# Patient Record
Sex: FEMALE | Race: BLACK OR AFRICAN AMERICAN | Employment: UNEMPLOYED | URBAN - METROPOLITAN AREA
[De-identification: names, ages, dates, MRNs, and addresses within clinical notes are randomized per-mention and may not be internally consistent; named-entity substitution may affect disease eponyms.]

---

## 2017-10-22 ENCOUNTER — HOSPITAL ENCOUNTER (OUTPATIENT)
Dept: INFUSION THERAPY | Age: 37
Discharge: HOME | End: 2017-10-22
Payer: COMMERCIAL

## 2017-10-22 VITALS
SYSTOLIC BLOOD PRESSURE: 154 MMHG | RESPIRATION RATE: 18 BRPM | TEMPERATURE: 98.8 F | HEART RATE: 129 BPM | OXYGEN SATURATION: 100 % | DIASTOLIC BLOOD PRESSURE: 66 MMHG

## 2017-10-22 PROCEDURE — 74011250636 HC RX REV CODE- 250/636

## 2017-10-22 PROCEDURE — 96365 THER/PROPH/DIAG IV INF INIT: CPT

## 2017-10-22 RX ADMIN — FERRIC CARBOXYMALTOSE INJECTION 750 MG: 50 INJECTION, SOLUTION INTRAVENOUS at 11:55

## 2017-10-22 NOTE — PROGRESS NOTES
Arrived to the [facility]. Injectafer 1st dose completed. Patient tolerated well. Any issues or concerns during appointment: had questions re: further workup for anemia, fatigue, weakness - encouraged patient to speak with PCP about concerns or referral. Voiced understanding. Patient aware of next infusion appointment on  10/29/17 @ 11am  Discharged ambulatory.

## 2017-10-29 ENCOUNTER — HOSPITAL ENCOUNTER (OUTPATIENT)
Dept: INFUSION THERAPY | Age: 37
Discharge: HOME | End: 2017-10-29
Payer: COMMERCIAL

## 2017-10-29 VITALS
DIASTOLIC BLOOD PRESSURE: 77 MMHG | HEART RATE: 62 BPM | TEMPERATURE: 98.1 F | OXYGEN SATURATION: 96 % | SYSTOLIC BLOOD PRESSURE: 131 MMHG | RESPIRATION RATE: 18 BRPM

## 2017-10-29 PROCEDURE — 74011250636 HC RX REV CODE- 250/636

## 2017-10-29 PROCEDURE — 96365 THER/PROPH/DIAG IV INF INIT: CPT

## 2017-10-29 RX ADMIN — FERRIC CARBOXYMALTOSE INJECTION 750 MG: 50 INJECTION, SOLUTION INTRAVENOUS at 12:53

## 2017-10-29 NOTE — PROGRESS NOTES
Arrived to the [facility]. Injectafer completed. Patient tolerated well. Pt observed for 30 minutes. Any issues or concerns during appointment: none. Discharged ambulatory.

## 2020-02-06 ENCOUNTER — APPOINTMENT (OUTPATIENT)
Dept: CT IMAGING | Age: 40
End: 2020-02-06
Attending: EMERGENCY MEDICINE
Payer: MEDICAID

## 2020-02-06 ENCOUNTER — APPOINTMENT (OUTPATIENT)
Dept: GENERAL RADIOLOGY | Age: 40
End: 2020-02-06
Attending: EMERGENCY MEDICINE
Payer: MEDICAID

## 2020-02-06 ENCOUNTER — HOSPITAL ENCOUNTER (EMERGENCY)
Age: 40
Discharge: HOME | End: 2020-02-06
Attending: EMERGENCY MEDICINE
Payer: MEDICAID

## 2020-02-06 VITALS
HEART RATE: 91 BPM | OXYGEN SATURATION: 98 % | BODY MASS INDEX: 34.99 KG/M2 | DIASTOLIC BLOOD PRESSURE: 106 MMHG | HEIGHT: 65 IN | RESPIRATION RATE: 18 BRPM | SYSTOLIC BLOOD PRESSURE: 190 MMHG | TEMPERATURE: 98.7 F | WEIGHT: 210 LBS

## 2020-02-06 DIAGNOSIS — G44.89 OTHER HEADACHE SYNDROME: ICD-10-CM

## 2020-02-06 DIAGNOSIS — R11.2 NON-INTRACTABLE VOMITING WITH NAUSEA, UNSPECIFIED VOMITING TYPE: ICD-10-CM

## 2020-02-06 DIAGNOSIS — R06.00 DYSPNEA, UNSPECIFIED TYPE: Primary | ICD-10-CM

## 2020-02-06 LAB
ALBUMIN SERPL-MCNC: 3.5 G/DL (ref 3.5–5)
ALBUMIN/GLOB SERPL: 0.6 {RATIO} (ref 1.2–3.5)
ALP SERPL-CCNC: 172 U/L (ref 50–136)
ALT SERPL-CCNC: 43 U/L (ref 12–65)
ANION GAP SERPL CALC-SCNC: 9 MMOL/L (ref 7–16)
AST SERPL-CCNC: 58 U/L (ref 15–37)
ATRIAL RATE: 139 BPM
BACTERIA URNS QL MICRO: 0 /HPF
BASOPHILS # BLD: 0 K/UL (ref 0–0.2)
BASOPHILS NFR BLD: 0 % (ref 0–2)
BILIRUB SERPL-MCNC: 0.3 MG/DL (ref 0.2–1.1)
BUN SERPL-MCNC: 4 MG/DL (ref 6–23)
CALCIUM SERPL-MCNC: 9.4 MG/DL (ref 8.3–10.4)
CALCULATED P AXIS, ECG09: 39 DEGREES
CALCULATED R AXIS, ECG10: 15 DEGREES
CALCULATED T AXIS, ECG11: 4 DEGREES
CASTS URNS QL MICRO: NORMAL /LPF
CHLORIDE SERPL-SCNC: 105 MMOL/L (ref 98–107)
CO2 SERPL-SCNC: 25 MMOL/L (ref 21–32)
CREAT SERPL-MCNC: 0.95 MG/DL (ref 0.6–1)
D DIMER PPP FEU-MCNC: 0.63 UG/ML(FEU)
DIAGNOSIS, 93000: NORMAL
DIFFERENTIAL METHOD BLD: ABNORMAL
EOSINOPHIL # BLD: 0 K/UL (ref 0–0.8)
EOSINOPHIL NFR BLD: 0 % (ref 0.5–7.8)
EPI CELLS #/AREA URNS HPF: NORMAL /HPF
ERYTHROCYTE [DISTWIDTH] IN BLOOD BY AUTOMATED COUNT: 18.1 % (ref 11.9–14.6)
FLUAV AG NPH QL IA: NEGATIVE
FLUBV AG NPH QL IA: NEGATIVE
GLOBULIN SER CALC-MCNC: 5.4 G/DL (ref 2.3–3.5)
GLUCOSE SERPL-MCNC: 109 MG/DL (ref 65–100)
HCT VFR BLD AUTO: 41.1 % (ref 35.8–46.3)
HGB BLD-MCNC: 13.3 G/DL (ref 11.7–15.4)
IMM GRANULOCYTES # BLD AUTO: 0 K/UL (ref 0–0.5)
IMM GRANULOCYTES NFR BLD AUTO: 0 % (ref 0–5)
LYMPHOCYTES # BLD: 1.6 K/UL (ref 0.5–4.6)
LYMPHOCYTES NFR BLD: 21 % (ref 13–44)
MAGNESIUM SERPL-MCNC: 1.8 MG/DL (ref 1.8–2.4)
MCH RBC QN AUTO: 29.2 PG (ref 26.1–32.9)
MCHC RBC AUTO-ENTMCNC: 32.4 G/DL (ref 31.4–35)
MCV RBC AUTO: 90.1 FL (ref 79.6–97.8)
MONOCYTES # BLD: 0.5 K/UL (ref 0.1–1.3)
MONOCYTES NFR BLD: 6 % (ref 4–12)
NEUTS SEG # BLD: 5.5 K/UL (ref 1.7–8.2)
NEUTS SEG NFR BLD: 72 % (ref 43–78)
NRBC # BLD: 0 K/UL (ref 0–0.2)
P-R INTERVAL, ECG05: 120 MS
PLATELET # BLD AUTO: 353 K/UL (ref 150–450)
PMV BLD AUTO: 10 FL (ref 9.4–12.3)
POTASSIUM SERPL-SCNC: 3.5 MMOL/L (ref 3.5–5.1)
PROT SERPL-MCNC: 8.9 G/DL (ref 6.3–8.2)
Q-T INTERVAL, ECG07: 282 MS
QRS DURATION, ECG06: 70 MS
QTC CALCULATION (BEZET), ECG08: 429 MS
RBC # BLD AUTO: 4.56 M/UL (ref 4.05–5.2)
RBC #/AREA URNS HPF: NORMAL /HPF
SODIUM SERPL-SCNC: 139 MMOL/L (ref 136–145)
SPECIMEN SOURCE: NORMAL
TROPONIN I SERPL-MCNC: <0.02 NG/ML (ref 0.02–0.05)
VENTRICULAR RATE, ECG03: 139 BPM
WBC # BLD AUTO: 7.7 K/UL (ref 4.3–11.1)
WBC URNS QL MICRO: NORMAL /HPF

## 2020-02-06 PROCEDURE — 74011000258 HC RX REV CODE- 258: Performed by: EMERGENCY MEDICINE

## 2020-02-06 PROCEDURE — 80053 COMPREHEN METABOLIC PANEL: CPT

## 2020-02-06 PROCEDURE — 83735 ASSAY OF MAGNESIUM: CPT

## 2020-02-06 PROCEDURE — 93005 ELECTROCARDIOGRAM TRACING: CPT | Performed by: EMERGENCY MEDICINE

## 2020-02-06 PROCEDURE — 84484 ASSAY OF TROPONIN QUANT: CPT

## 2020-02-06 PROCEDURE — 74011000250 HC RX REV CODE- 250: Performed by: EMERGENCY MEDICINE

## 2020-02-06 PROCEDURE — 71045 X-RAY EXAM CHEST 1 VIEW: CPT

## 2020-02-06 PROCEDURE — 81015 MICROSCOPIC EXAM OF URINE: CPT

## 2020-02-06 PROCEDURE — 96375 TX/PRO/DX INJ NEW DRUG ADDON: CPT

## 2020-02-06 PROCEDURE — 74011250637 HC RX REV CODE- 250/637: Performed by: EMERGENCY MEDICINE

## 2020-02-06 PROCEDURE — 74011636320 HC RX REV CODE- 636/320: Performed by: EMERGENCY MEDICINE

## 2020-02-06 PROCEDURE — 96361 HYDRATE IV INFUSION ADD-ON: CPT

## 2020-02-06 PROCEDURE — 74011250636 HC RX REV CODE- 250/636: Performed by: EMERGENCY MEDICINE

## 2020-02-06 PROCEDURE — 96374 THER/PROPH/DIAG INJ IV PUSH: CPT

## 2020-02-06 PROCEDURE — 71260 CT THORAX DX C+: CPT

## 2020-02-06 PROCEDURE — 81003 URINALYSIS AUTO W/O SCOPE: CPT

## 2020-02-06 PROCEDURE — 87804 INFLUENZA ASSAY W/OPTIC: CPT

## 2020-02-06 PROCEDURE — 85379 FIBRIN DEGRADATION QUANT: CPT

## 2020-02-06 PROCEDURE — 99285 EMERGENCY DEPT VISIT HI MDM: CPT

## 2020-02-06 PROCEDURE — 85025 COMPLETE CBC W/AUTO DIFF WBC: CPT

## 2020-02-06 RX ORDER — LISINOPRIL 10 MG/1
10 TABLET ORAL DAILY
Qty: 30 TAB | Refills: 0 | Status: SHIPPED | OUTPATIENT
Start: 2020-02-06 | End: 2020-03-07

## 2020-02-06 RX ORDER — THIAMINE HYDROCHLORIDE 100 MG/ML
100 INJECTION, SOLUTION INTRAMUSCULAR; INTRAVENOUS
Status: COMPLETED | OUTPATIENT
Start: 2020-02-06 | End: 2020-02-06

## 2020-02-06 RX ORDER — LORAZEPAM 1 MG/1
1 TABLET ORAL
Status: COMPLETED | OUTPATIENT
Start: 2020-02-06 | End: 2020-02-06

## 2020-02-06 RX ORDER — ONDANSETRON 4 MG/1
4 TABLET, FILM COATED ORAL
Qty: 12 TAB | Refills: 0 | Status: SHIPPED | OUTPATIENT
Start: 2020-02-06 | End: 2020-02-06

## 2020-02-06 RX ORDER — ONDANSETRON 4 MG/1
4 TABLET, FILM COATED ORAL
Qty: 12 TAB | Refills: 0 | Status: SHIPPED | OUTPATIENT
Start: 2020-02-06

## 2020-02-06 RX ORDER — HYDROCODONE BITARTRATE AND ACETAMINOPHEN 5; 325 MG/1; MG/1
1 TABLET ORAL ONCE
Status: COMPLETED | OUTPATIENT
Start: 2020-02-06 | End: 2020-02-06

## 2020-02-06 RX ORDER — BUTALBITAL, ACETAMINOPHEN AND CAFFEINE 300; 40; 50 MG/1; MG/1; MG/1
1 CAPSULE ORAL
Qty: 12 CAP | Refills: 0 | Status: SHIPPED | OUTPATIENT
Start: 2020-02-06 | End: 2020-02-06

## 2020-02-06 RX ORDER — ACETAMINOPHEN 500 MG
1000 TABLET ORAL
Status: COMPLETED | OUTPATIENT
Start: 2020-02-06 | End: 2020-02-06

## 2020-02-06 RX ORDER — ONDANSETRON 2 MG/ML
4 INJECTION INTRAMUSCULAR; INTRAVENOUS
Status: COMPLETED | OUTPATIENT
Start: 2020-02-06 | End: 2020-02-06

## 2020-02-06 RX ORDER — SODIUM CHLORIDE 0.9 % (FLUSH) 0.9 %
10 SYRINGE (ML) INJECTION
Status: COMPLETED | OUTPATIENT
Start: 2020-02-06 | End: 2020-02-06

## 2020-02-06 RX ORDER — LABETALOL HYDROCHLORIDE 5 MG/ML
20 INJECTION, SOLUTION INTRAVENOUS
Status: COMPLETED | OUTPATIENT
Start: 2020-02-06 | End: 2020-02-06

## 2020-02-06 RX ORDER — BUTALBITAL, ACETAMINOPHEN AND CAFFEINE 300; 40; 50 MG/1; MG/1; MG/1
1 CAPSULE ORAL
Qty: 12 CAP | Refills: 0 | Status: SHIPPED | OUTPATIENT
Start: 2020-02-06 | End: 2022-05-11

## 2020-02-06 RX ORDER — SODIUM CHLORIDE 9 MG/ML
1000 INJECTION, SOLUTION INTRAVENOUS ONCE
Status: COMPLETED | OUTPATIENT
Start: 2020-02-06 | End: 2020-02-06

## 2020-02-06 RX ADMIN — LABETALOL HYDROCHLORIDE 20 MG: 5 INJECTION INTRAVENOUS at 10:52

## 2020-02-06 RX ADMIN — Medication 10 ML: at 12:43

## 2020-02-06 RX ADMIN — SODIUM CHLORIDE 1000 ML: 900 INJECTION, SOLUTION INTRAVENOUS at 09:11

## 2020-02-06 RX ADMIN — LORAZEPAM 1 MG: 1 TABLET ORAL at 09:10

## 2020-02-06 RX ADMIN — METHYLPREDNISOLONE SODIUM SUCCINATE 125 MG: 125 INJECTION, POWDER, FOR SOLUTION INTRAMUSCULAR; INTRAVENOUS at 09:11

## 2020-02-06 RX ADMIN — SODIUM CHLORIDE 100 ML: 900 INJECTION, SOLUTION INTRAVENOUS at 12:43

## 2020-02-06 RX ADMIN — ACETAMINOPHEN 1000 MG: 500 TABLET, FILM COATED ORAL at 09:10

## 2020-02-06 RX ADMIN — ONDANSETRON 4 MG: 2 INJECTION INTRAMUSCULAR; INTRAVENOUS at 09:11

## 2020-02-06 RX ADMIN — HYDROCODONE BITARTRATE AND ACETAMINOPHEN 1 TABLET: 5; 325 TABLET ORAL at 13:23

## 2020-02-06 RX ADMIN — THIAMINE HYDROCHLORIDE 100 MG: 100 INJECTION, SOLUTION INTRAMUSCULAR; INTRAVENOUS at 09:11

## 2020-02-06 RX ADMIN — IOPAMIDOL 100 ML: 755 INJECTION, SOLUTION INTRAVENOUS at 12:43

## 2020-02-06 NOTE — ED PROVIDER NOTES
Patient complains of diarrhea onset Tuesday, February 4. Multiple loose BM's - about 4 to 5 per day. Last one at 4 am today. Yesterday onset of illness hitting her like a \" ton of bricks\". Felt hot and cold. No temp taken at home. Runny nose, cough productive of yellow phlegm, sore throat, headache, vomiting. States vomited all night long. No hematemesis. SOB today. Used Albuterol x 2. History of asthma. Does not smoke. Does drink alcohol daily. Last drink was yesterday. She has had a gastric bypass and has had a uterine ablation. No menses in several years.             Past Medical History:   Diagnosis Date    Asthma     Gastrointestinal disorder     IBS    Obesity, morbid (more than 100 lbs over ideal weight or BMI > 40 BMI 41.6    Ovarian cyst        Past Surgical History:   Procedure Laterality Date    ABDOMEN SURGERY PROC UNLISTED      debride where MIRANDA tube was    HX GYN      C-sec X 2;  uterine ablation/tubal         Family History:   Problem Relation Age of Onset    Diabetes Mother     Ovarian Cancer Sister     Prostate Cancer Brother     Breast Cancer Sister        Social History     Socioeconomic History    Marital status:      Spouse name: Not on file    Number of children: Not on file    Years of education: Not on file    Highest education level: Not on file   Occupational History    Not on file   Social Needs    Financial resource strain: Not on file    Food insecurity:     Worry: Not on file     Inability: Not on file    Transportation needs:     Medical: Not on file     Non-medical: Not on file   Tobacco Use    Smoking status: Never Smoker   Substance and Sexual Activity    Alcohol use: No    Drug use: No    Sexual activity: Yes     Partners: Male     Birth control/protection: Surgical     Comment: Ablation   Lifestyle    Physical activity:     Days per week: Not on file     Minutes per session: Not on file    Stress: Not on file   Relationships    Social connections: Talks on phone: Not on file     Gets together: Not on file     Attends Yazidism service: Not on file     Active member of club or organization: Not on file     Attends meetings of clubs or organizations: Not on file     Relationship status: Not on file    Intimate partner violence:     Fear of current or ex partner: Not on file     Emotionally abused: Not on file     Physically abused: Not on file     Forced sexual activity: Not on file   Other Topics Concern    Not on file   Social History Narrative    Not on file         ALLERGIES: Pcn [penicillins]; Apple; and Peach (prunus persica)    Review of Systems   Constitutional: Positive for chills and fever (subjective). HENT: Positive for congestion, rhinorrhea and sore throat. Eyes: Negative. Respiratory: Positive for cough and shortness of breath. Cardiovascular: Negative for chest pain, palpitations and leg swelling. Gastrointestinal: Positive for diarrhea, nausea and vomiting. Negative for abdominal pain. Genitourinary: Positive for frequency and urgency. Negative for dysuria. Musculoskeletal: Positive for myalgias. Skin: Negative. Neurological: Positive for headaches. Hematological: Negative. Vitals:    02/06/20 0825 02/06/20 0837   BP: (!) 157/116 (!) 165/91   Pulse: (!) 136    Resp: 18    Temp: 98.7 °F (37.1 °C)    SpO2: 100% 99%   Weight: 95.3 kg (210 lb)    Height: 5' 5\" (1.651 m)             Physical Exam  Vitals signs and nursing note reviewed. Constitutional:       Appearance: She is obese. She is not ill-appearing. HENT:      Right Ear: Tympanic membrane normal.      Left Ear: Tympanic membrane normal.      Nose: Congestion present. Mouth/Throat:      Mouth: Mucous membranes are moist.      Pharynx: No oropharyngeal exudate or posterior oropharyngeal erythema. Eyes:      Extraocular Movements: Extraocular movements intact. Pupils: Pupils are equal, round, and reactive to light.    Neck: Musculoskeletal: Normal range of motion and neck supple. Cardiovascular:      Rate and Rhythm: Tachycardia present. Heart sounds: Normal heart sounds. No murmur. No gallop. Pulmonary:      Effort: Pulmonary effort is normal.      Breath sounds: Normal breath sounds. Abdominal:      General: Bowel sounds are normal.      Palpations: Abdomen is soft. Tenderness: There is no abdominal tenderness. Musculoskeletal:         General: No swelling. Lymphadenopathy:      Cervical: No cervical adenopathy. Skin:     General: Skin is warm and dry. Findings: No rash. Neurological:      General: No focal deficit present. Mental Status: She is alert. Psychiatric:         Mood and Affect: Mood normal.          MDM  Number of Diagnoses or Management Options  Dyspnea, unspecified type:   Non-intractable vomiting with nausea, unspecified vomiting type: Other headache syndrome:   Diagnosis management comments: Vomiting, diarrhea, cough, SOB  EKG sinus tachycardia rate 139  CXR hypoinflation, no consolidation. Radiology report reviewed  Labs reviewed  NS 1 liter IV  Thiamine 100 mg IV  Zofran 4 mg IV  Solumedrol 125 mg IV  Tylenol 1000 mg po  Ativan 1 mg po  CT chest negative for infiltrate or PE  Tichnor 5 for headache  Labetalol for tachycardia / hypertension  Improved  VS improved  Results and instructions discussed with patient and friend  Rx Lisinopril 10 daily, Fioricet prn, Zofran prn  Follow up with PCP - needs to establish  Return with new or worse symptoms.          Amount and/or Complexity of Data Reviewed  Clinical lab tests: ordered and reviewed  Tests in the radiology section of CPT®: ordered and reviewed  Review and summarize past medical records: yes  Independent visualization of images, tracings, or specimens: yes    Critical Care  Total time providing critical care: 30-74 minutes (Critical care time 30 minutes)    Patient Progress  Patient progress: improved Procedures

## 2020-02-06 NOTE — ED TRIAGE NOTES
Patient states sob, migraine, n/v and diarrhea. States diarrhea for 2 days. N/v began last night, unable to keep any food or liquids down. Migraine began last night, hx of migraines. Patient with hx of asthma. Gave self nebulizer at home this morning at 0300 and another at 0500.  O2 in triage 97% RA

## 2020-02-06 NOTE — ED NOTES
I have reviewed discharge instructions with the patient. The patient verbalized understanding. Patient left ED via Discharge Method: ambulatory to Home with friend    Opportunity for questions and clarification provided. Patient given 3 scripts. No esign        To continue your aftercare when you leave the hospital, you may receive an automated call from our care team to check in on how you are doing. This is a free service and part of our promise to provide the best care and service to meet your aftercare needs.  If you have questions, or wish to unsubscribe from this service please call [phone]. Thank you for Choosing our [facility] Emergency Department.

## 2022-05-11 PROBLEM — F10.939 ALCOHOL WITHDRAWAL SEIZURE WITH COMPLICATION (HCC): Status: ACTIVE | Noted: 2021-08-15

## 2022-05-11 PROBLEM — F33.2 SEVERE EPISODE OF RECURRENT MAJOR DEPRESSIVE DISORDER, WITHOUT PSYCHOTIC FEATURES (HCC): Status: ACTIVE | Noted: 2020-02-25

## 2022-05-11 PROBLEM — J45.909 ASTHMA: Status: ACTIVE | Noted: 2019-02-10

## 2022-05-11 PROBLEM — R56.9 ALCOHOL WITHDRAWAL SEIZURE WITH COMPLICATION (HCC): Status: ACTIVE | Noted: 2021-08-15

## 2022-05-11 PROBLEM — I50.9 CHF (CONGESTIVE HEART FAILURE) (HCC): Status: ACTIVE | Noted: 2019-02-10

## 2022-05-11 PROBLEM — F41.1 GENERALIZED ANXIETY DISORDER: Status: ACTIVE | Noted: 2020-02-25

## 2022-05-11 PROBLEM — E55.9 VITAMIN D DEFICIENCY: Status: ACTIVE | Noted: 2020-08-13

## 2022-05-12 PROBLEM — R53.83 FATIGUE: Status: ACTIVE | Noted: 2022-05-12

## 2022-05-12 PROBLEM — Z98.84 S/P GASTRIC BYPASS: Status: ACTIVE | Noted: 2022-05-12

## 2022-05-12 PROBLEM — M25.561 CHRONIC PAIN OF RIGHT KNEE: Status: ACTIVE | Noted: 2022-05-12

## 2022-05-12 PROBLEM — G89.29 CHRONIC PAIN OF RIGHT KNEE: Status: ACTIVE | Noted: 2022-05-12

## 2022-05-16 ENCOUNTER — HOSPITAL ENCOUNTER (OUTPATIENT)
Dept: GENERAL RADIOLOGY | Age: 42
Discharge: HOME | End: 2022-05-16
Payer: COMMERCIAL

## 2022-05-16 DIAGNOSIS — G89.29 CHRONIC PAIN OF RIGHT KNEE: ICD-10-CM

## 2022-05-16 DIAGNOSIS — M25.561 CHRONIC PAIN OF RIGHT KNEE: ICD-10-CM

## 2022-05-16 PROCEDURE — 73562 X-RAY EXAM OF KNEE 3: CPT

## 2022-05-16 NOTE — PROGRESS NOTES
Please let patient know her xray revealed Osteoarthritis of the patellofemoral joint. Advise physical therapy. Can also refer to ortho if she would like to consider other treatments like steroid injections.      Betito Barragan NP

## 2022-05-31 ENCOUNTER — INITIAL CONSULT (OUTPATIENT)
Dept: CARDIOLOGY CLINIC | Age: 42
End: 2022-05-31
Payer: MEDICAID

## 2022-05-31 VITALS
BODY MASS INDEX: 37.65 KG/M2 | SYSTOLIC BLOOD PRESSURE: 98 MMHG | HEIGHT: 65 IN | HEART RATE: 65 BPM | WEIGHT: 226 LBS | DIASTOLIC BLOOD PRESSURE: 64 MMHG

## 2022-05-31 DIAGNOSIS — I10 PRIMARY HYPERTENSION: ICD-10-CM

## 2022-05-31 DIAGNOSIS — I50.32 CHRONIC DIASTOLIC CONGESTIVE HEART FAILURE (HCC): Primary | ICD-10-CM

## 2022-05-31 PROCEDURE — 99244 OFF/OP CNSLTJ NEW/EST MOD 40: CPT | Performed by: INTERNAL MEDICINE

## 2022-05-31 PROCEDURE — 93000 ELECTROCARDIOGRAM COMPLETE: CPT | Performed by: INTERNAL MEDICINE

## 2022-05-31 RX ORDER — FERROUS SULFATE 324(65)MG
324 TABLET, DELAYED RELEASE (ENTERIC COATED) ORAL
COMMUNITY
Start: 2022-05-13

## 2022-05-31 RX ORDER — OLMESARTAN MEDOXOMIL 20 MG/1
20 TABLET ORAL DAILY
COMMUNITY

## 2022-05-31 RX ORDER — METOPROLOL SUCCINATE 25 MG/1
TABLET, EXTENDED RELEASE ORAL
COMMUNITY
Start: 2022-05-16

## 2022-05-31 RX ORDER — PHENOL 1.4 %
AEROSOL, SPRAY (ML) MUCOUS MEMBRANE
COMMUNITY

## 2022-05-31 RX ORDER — BUDESONIDE AND FORMOTEROL FUMARATE DIHYDRATE 160; 4.5 UG/1; UG/1
2 AEROSOL RESPIRATORY (INHALATION)
COMMUNITY

## 2022-05-31 RX ORDER — POTASSIUM CHLORIDE 20 MEQ/1
TABLET, EXTENDED RELEASE ORAL
COMMUNITY
Start: 2022-05-16

## 2022-05-31 RX ORDER — CETIRIZINE HYDROCHLORIDE 10 MG/1
10 TABLET ORAL DAILY
COMMUNITY

## 2022-05-31 RX ORDER — DULOXETIN HYDROCHLORIDE 20 MG/1
30 CAPSULE, DELAYED RELEASE ORAL DAILY
COMMUNITY
Start: 2021-09-14

## 2022-05-31 RX ORDER — ALBUTEROL SULFATE 90 UG/1
AEROSOL, METERED RESPIRATORY (INHALATION) EVERY 4 HOURS PRN
COMMUNITY

## 2022-05-31 RX ORDER — MELOXICAM 15 MG/1
15 TABLET ORAL DAILY
COMMUNITY
End: 2022-07-12

## 2022-05-31 RX ORDER — BUSPIRONE HYDROCHLORIDE 30 MG/1
30 TABLET ORAL DAILY
COMMUNITY

## 2022-05-31 NOTE — PROGRESS NOTES
[address], [address] [employer] [address]  PHONE: [phone]        22    NAME:  Malachi Aldrich  : 1980  MRN: [id number]       SUBJECTIVE:   Malachi Aldrich is a 35 y.o. female seen for a consultation visit regarding the following:     Chief Complaint   Patient presents with    Congestive Heart Failure    Other     Consult          HPI:  Consultation is requested by SÁNCHEZ Zarate CNP for evaluation of Congestive Heart Failure and Other (Consult)  Prior NICM yrs ago she reports.  Echo [place] showed EF 60%. Claims yrs ago, she had edema. Some recent edema again. On new BP meds now, this is new issue. On ARB. No CP, pressure. On BB now as well. Edema better now. Patient denies recent history of orthopnea, PND, excessive dizziness and/or syncope. Former heavy alcohol user, stopped now. Been in rehab. Past Medical History, Past Surgical History, Family history, Social History, and Medications were all reviewed with the patient today and updated as necessary.        Current Outpatient Medications   Medication Sig Dispense Refill    Cholecalciferol 50 MCG ( UT) CAPS Take 2,000 Units by mouth 2 times daily      Ferrous Sulfate 324 MG TBEC Take 324 mg by mouth      meloxicam (MOBIC) 15 MG tablet Take 15 mg by mouth daily      busPIRone (BUSPAR) 30 MG tablet Take 30 mg by mouth daily      cetirizine (ZYRTEC) 10 MG tablet Take 10 mg by mouth daily      DULoxetine (CYMBALTA) 20 MG extended release capsule Take 30 mg by mouth daily      Melatonin 10 MG TABS Take by mouth      metoprolol succinate (TOPROL XL) 25 MG extended release tablet       Multiple Vitamin (MVI, CELEBRATE, CHEWABLE TABLET) Take by mouth daily      potassium chloride (KLOR-CON M) 20 MEQ extended release tablet       albuterol sulfate  (90 Base) MCG/ACT inhaler Inhale into the lungs every 4 hours as needed      budesonide-formoterol (SYMBICORT) 160-4.5 MCG/ACT AERO Inhale 2 puffs into the lungs      olmesartan (BENICAR) 20 mg tablet Take 20 mg by mouth daily       No current facility-administered medications for this visit. Allergies   Allergen Reactions    Penicillins Other (See Comments)     Abdominal Pain    Apple Itching and Swelling    Prunus Persica Itching and Swelling     Patient Active Problem List    Diagnosis Date Noted    Primary hypertension 05/31/2022     Priority: Medium    S/P gastric bypass 05/12/2022    Fatigue 05/12/2022    Chronic pain of right knee 05/12/2022    Alcohol withdrawal seizure with complication ([facility] [address] 11/89/3913    Vitamin D deficiency 08/13/2020    Generalized anxiety disorder 02/25/2020    Severe episode of recurrent major depressive disorder, without psychotic features ([facility] [address] 02/25/2020    Chronic diastolic congestive heart failure ([facility] [address] 02/10/2019    Asthma 02/10/2019    GERD (gastroesophageal reflux disease) 04/23/2015    Malnutrition following gastrointestinal surgery 04/23/2015      Past Surgical History:   Procedure Laterality Date    GYN      C-sec X 2;  uterine ablation/tubal    ME ABDOMEN SURGERY PROC UNLISTED      debride where SALAZAR tube was     Family History   Problem Relation Age of Onset    Breast Cancer Sister     Prostate Cancer Brother     Ovarian Cancer Sister     Diabetes Mother      Social History     Tobacco Use    Smoking status: Never Smoker    Smokeless tobacco: Never Used   Substance Use Topics    Alcohol use: No       ROS:    Constitutional:   Negative for fevers and unexplained weight loss. Eyes:   Negative for visual disturbance. ENT:   Negative for significant hearing loss and tinnitus. Respiratory:   Negative for hemoptysis. Cardiovascular:   Negative except as noted in HPI. Gastrointestinal:   Negative for melena and abdominal pain. Genitourinary:   Negative for hematuria, renal stones.   Integumentary:   Negative for rash or non-healing wounds  Hematologic/Lymphatic:   Negative for excessive bleeding hx or clotting disorder. Musculoskeletal:  Negative for active, unexplained/severe joint pain. Neurological:   Negative for stroke. Behavioral/Psych:   Negative for suicidal ideations. Endocrine:   Negative for uncontrolled diabetic symptoms including polyuria, polydipsia and poor wound healing. PHYSICAL EXAM:     BP 98/64   Pulse 65   Ht 5' 5\" (1.651 m)   Wt 226 lb (102.5 kg) Comment: with shoes  BMI 37.61 kg/m²    General/Constitutional:   Alert and oriented x 3, no acute distress  HEENT:   normocephalic, atraumatic, moist mucous membranes  Neck:   No JVD or carotid bruits bilaterally  Cardiovascular:   regular rate and rhythm, no murmur/rub/gallop appreciated  Pulmonary:   clear to auscultation bilaterally, no respiratory distress  Abdomen:   soft, non-tender, non-distended  Ext:   No sig LE edema bilaterally  Skin:  warm and dry, no obvious rashes seen  Neuro:   no obvious sensory or motor deficits  Psychiatric:   normal mood and affect    EKG Today and independently reviewed by me: sinus rhythm, normal intervals and non-specific ST/T wave changes. Medical problems, medical history and test results were reviewed with the patient today.        Lab Results   Component Value Date     05/11/2022    K 5.1 05/11/2022     05/11/2022    CO2 20 05/11/2022    BUN 13 05/11/2022    CREATININE 0.90 05/11/2022    GLUCOSE 93 05/11/2022    CALCIUM 9.7 05/11/2022        Lab Results   Component Value Date    WBC 7.9 05/11/2022    HGB 11.2 05/11/2022    HCT 36.3 05/11/2022    MCV 80 05/11/2022     05/11/2022       Lab Results   Component Value Date    TSH 2.030 05/11/2022       No results found for: LABA1C  No results found for: EAG      Lab Results   Component Value Date    CHOL 224 (H) 05/11/2022     Lab Results   Component Value Date    TRIG 141 05/11/2022     Lab Results   Component Value Date    HDL 61 05/11/2022     Lab Results   Component Value Date    LDLCALC 138 (H) 05/11/2022     Lab Results   Component Value Date    VLDL 25 05/11/2022     No results found for: VIOLET        I have Independently reviewed prior care notes, any ER records available, cardiac testing, labs and results with the patient and before seeing the patient today. Also independently reviewed outside records when available. ASSESSMENT:    Geraldine Gordillo was seen today for congestive heart failure and other. Diagnoses and all orders for this visit:    CHF (congestive heart failure) (HCC)  -     EKG 12 Lead  -     Transthoracic echocardiogram (TTE) complete with contrast, bubble, strain, and 3D PRN; Future    Chronic diastolic congestive heart failure (HCC)  -     Transthoracic echocardiogram (TTE) complete with contrast, bubble, strain, and 3D PRN; Future    Primary hypertension          PLAN:     Prior reported NICM she claims, new HTN, quit alcohol 90 days ago. NICM from EtOH?? Check echo, assess EF. Remain on BB and ARB, follow Bps, assess for LVH. No stress test needed, The patient has been instructed to call with any angina or equivalent as reviewed today. All questions were answered with the patient voicing complete understanding. Patient has been instructed and agrees to call our office with any issues or other concerns related to their cardiac condition(s) and/or complaint(s).         Return for Return After Test.       YONNY DOAN,   5/31/2022

## 2022-06-13 DIAGNOSIS — H52.13 MYOPIA OF BOTH EYES: Primary | ICD-10-CM

## 2022-07-07 ENCOUNTER — TELEPHONE (OUTPATIENT)
Dept: CARDIOLOGY CLINIC | Age: 42
End: 2022-07-07

## 2022-07-07 NOTE — TELEPHONE ENCOUNTER
----- Message from Ele Grajeda DO sent at 7/6/2022  2:57 PM EDT -----  Please call the patient. ECHO was ok, nothing major or concerning. If having more angina (worsening RAMIREZ, CP, SOB), please let us know. Will review more at follow up appointment and get plan for the future.     Thanks,   Aldo Zafar

## 2022-07-12 ENCOUNTER — OFFICE VISIT (OUTPATIENT)
Dept: ORTHOPEDIC SURGERY | Age: 42
End: 2022-07-12
Payer: COMMERCIAL

## 2022-07-12 DIAGNOSIS — M25.561 RIGHT KNEE PAIN, UNSPECIFIED CHRONICITY: Primary | ICD-10-CM

## 2022-07-12 PROCEDURE — 99203 OFFICE O/P NEW LOW 30 MIN: CPT | Performed by: ORTHOPAEDIC SURGERY

## 2022-07-12 PROCEDURE — 20610 DRAIN/INJ JOINT/BURSA W/O US: CPT | Performed by: ORTHOPAEDIC SURGERY

## 2022-07-12 RX ORDER — TRIAMCINOLONE ACETONIDE 40 MG/ML
40 INJECTION, SUSPENSION INTRA-ARTICULAR; INTRAMUSCULAR ONCE
Status: COMPLETED | OUTPATIENT
Start: 2022-07-12 | End: 2022-07-12

## 2022-07-12 RX ORDER — CELECOXIB 200 MG/1
200 CAPSULE ORAL DAILY
Qty: 30 CAPSULE | Refills: 1 | Status: SHIPPED | OUTPATIENT
Start: 2022-07-12

## 2022-07-12 RX ADMIN — TRIAMCINOLONE ACETONIDE 40 MG: 40 INJECTION, SUSPENSION INTRA-ARTICULAR; INTRAMUSCULAR at 14:13

## 2022-07-12 NOTE — PROGRESS NOTES
Name: Simone Perla  YOB: 1980  Gender: female  MRN: [id number]      CC: New Patient (R knee pain)       HPI: Simone Perla is a 38 y.o. female who presents with New Patient (R knee pain)  Mrs. Shary Mcburney is a new patient who presents with right knee pain. She reports an ongoing history of knee pain that has worsened over the past few months without any mechanism of injury. She notes that her pain is constant and radiates down her shin. It worsens throughout the night and she often experiences stiffness and a popping sensation when she ambulates. She denies having had any formal treatment prior to today but has xrays for my review. She does have a remote history of patellofemoral syndrome and maltracking that was treated with physical therapy in the past when she was much younger. ROS/Meds/PSH/PMH/FH/SH: I personally reviewed the patients standard intake form. Below are the pertinents    Tobacco:  reports that she has never smoked. She has never used smokeless tobacco.  Diabetes: none  Other: CHF, history of alcohol abuse with withdrawal, GERD, asthma    Physical Examination:  General: no acute distress  Lungs: breathing easily  CV: regular rhythm by pulse  Right Knee: She has crepitus and pain upon flexion and extension of right knee with some lateral maltracking of her patella. She has tenderness palpation of the medial joint line tenderness in the parapatellar region around her patella. No effusion. Mild discomfort with meniscal provocative testing. Ligamentously stable x4      Imaging:   I reviewed plain radiographs of the right knee from 8/16/2022 which demonstrate mild joint space narrowing medially with early osteophyte formation no acute findings  All imaging interpreted by myself Winston Caruso MD independent of radiology review        Assessment:     ICD-10-CM    1.  Right knee pain, unspecified chronicity  M25.561 celecoxib (CELEBREX) 200 MG capsule     DRAIN/INJECT LARGE JOINT/BURSA     triamcinolone acetonide (KENALOG-40) injection 40 mg       Plan:   I think the majority of her symptoms are degenerative changes as well as patellofemoral maltracking and chondromalacia. I discussed physical therapy she says unfortunately she does not have transportation to get the physical therapy reliably. We taught her home exercise quad strengthening program today. We also discussed an intra-articular injection which she like to proceed with. We also discussed anti-inflammatories and Celebrex was prescribed. I will have her follow-up with Nayeli Manzo in 6 weeks if she is not improving the neck step would be an MRI scan. The patient elected to proceed with an intraarticular knee injection today. After verbal informed consent was obtained after sterile prep the right knee  was injected from a superior lateral approach with 4 cc of 0.25% Marcaine and 1 cc of 40 mg Kenalog. The patient tolerated the procedure well was given postinjection flare precautions. Clive Garcia MD, [facility]

## 2022-09-22 DIAGNOSIS — M25.561 RIGHT KNEE PAIN, UNSPECIFIED CHRONICITY: ICD-10-CM

## 2022-09-22 RX ORDER — CELECOXIB 200 MG/1
CAPSULE ORAL
Qty: 30 CAPSULE | Refills: 1 | OUTPATIENT
Start: 2022-09-22

## 2022-12-05 ENCOUNTER — TELEPHONE (OUTPATIENT)
Dept: ORTHOPEDIC SURGERY | Age: 42
End: 2022-12-05

## 2022-12-05 ENCOUNTER — OFFICE VISIT (OUTPATIENT)
Dept: ORTHOPEDIC SURGERY | Age: 42
End: 2022-12-05
Payer: MEDICAID

## 2022-12-05 VITALS — WEIGHT: 230 LBS | HEIGHT: 65 IN | BODY MASS INDEX: 38.32 KG/M2

## 2022-12-05 DIAGNOSIS — M25.561 RIGHT KNEE PAIN, UNSPECIFIED CHRONICITY: Primary | ICD-10-CM

## 2022-12-05 PROCEDURE — 99213 OFFICE O/P EST LOW 20 MIN: CPT | Performed by: SPECIALIST/TECHNOLOGIST

## 2022-12-05 RX ORDER — CELECOXIB 200 MG/1
200 CAPSULE ORAL DAILY
Qty: 30 CAPSULE | Refills: 1 | Status: SHIPPED | OUTPATIENT
Start: 2022-12-05

## 2022-12-05 NOTE — PROGRESS NOTES
Name: Lei Yancey  YOB: 1980  Gender: female  MRN: [id number]      CC: No chief complaint on file. HPI: Lei Yancey is a 38 y.o. female who returns for follow up on right knee. Pt states she sustained a fall 3 weeks ago to the right knee and was seen at the ER. She states she has a knot and cannot apply pressure to the right knee without unbearable pain. She reports that she never really had much relief from previous injection prior to the fall. Physical Examination:  General: no acute distress  Lungs: breathing easily  CV: regular rhythm by pulse  Right Knee: Raised area cystic in appearance present over the lateral knee. Minimal effusion present. Tenderness to palpation in both the medial and lateral joint line and over the area of cyst.  Full active and passive range of motion with pain in the extreme of extension. Negative ligamentous exam x4. Mild pain with Alin's. Positive bounce home test.        Assessment:     ICD-10-CM    1. Right knee pain, unspecified chronicity  M25.561           Plan:   Patient who had an acute traumatic fall onto her knee and presented with a cyst after injury that is painful. Patient has difficulty with weightbearing and had prior conservative treatment. Due to the acute nature of her fall and the presentation of the cyst which was not present prior to the fall think it is reasonable to evaluate her knee with an MRI. She can return after MRI for definitive treatment.             Mariah Cano, MARISOL  Orthopaedics and Sports Medicine

## 2023-03-21 ENCOUNTER — TELEPHONE (OUTPATIENT)
Dept: ORTHOPEDIC SURGERY | Age: 43
End: 2023-03-21

## 2023-03-22 ENCOUNTER — TELEPHONE (OUTPATIENT)
Dept: ORTHOPEDIC SURGERY | Age: 43
End: 2023-03-22

## 2023-03-22 RX ORDER — CELECOXIB 200 MG/1
200 CAPSULE ORAL DAILY
Qty: 60 CAPSULE | Refills: 3 | Status: SHIPPED | OUTPATIENT
Start: 2023-03-22

## 2023-03-22 NOTE — TELEPHONE ENCOUNTER
She was supposed to have Celebrex sent in but it's not at her pharmacy. She is now requesting it to be sent to the [facility] in [facility] since that's near her work and I have added that to her chart.

## 2023-07-30 ENCOUNTER — APPOINTMENT (OUTPATIENT)
Dept: GENERAL RADIOLOGY | Age: 43
End: 2023-07-30
Payer: MEDICAID

## 2023-07-30 ENCOUNTER — HOSPITAL ENCOUNTER (EMERGENCY)
Age: 43
Discharge: HOME | End: 2023-07-30
Attending: EMERGENCY MEDICINE
Payer: MEDICAID

## 2023-07-30 VITALS
DIASTOLIC BLOOD PRESSURE: 68 MMHG | RESPIRATION RATE: 16 BRPM | OXYGEN SATURATION: 99 % | TEMPERATURE: 99 F | WEIGHT: 240 LBS | HEART RATE: 94 BPM | BODY MASS INDEX: 39.99 KG/M2 | SYSTOLIC BLOOD PRESSURE: 103 MMHG | HEIGHT: 65 IN

## 2023-07-30 DIAGNOSIS — M25.561 CHRONIC PAIN OF RIGHT KNEE: ICD-10-CM

## 2023-07-30 DIAGNOSIS — M25.562 ACUTE PAIN OF LEFT KNEE: Primary | ICD-10-CM

## 2023-07-30 DIAGNOSIS — M79.9 SOFT TISSUE LESION OF KNEE REGION: ICD-10-CM

## 2023-07-30 DIAGNOSIS — G89.29 CHRONIC PAIN OF RIGHT KNEE: ICD-10-CM

## 2023-07-30 PROCEDURE — 73562 X-RAY EXAM OF KNEE 3: CPT

## 2023-07-30 PROCEDURE — 99283 EMERGENCY DEPT VISIT LOW MDM: CPT

## 2023-07-30 RX ORDER — MELOXICAM 15 MG/1
15 TABLET ORAL DAILY
Qty: 30 TABLET | Refills: 0 | Status: SHIPPED | OUTPATIENT
Start: 2023-07-30 | End: 2023-08-29

## 2023-07-30 ASSESSMENT — ENCOUNTER SYMPTOMS
FACIAL SWELLING: 0
SHORTNESS OF BREATH: 0
VOMITING: 0
TROUBLE SWALLOWING: 0
PHOTOPHOBIA: 0
COUGH: 0
ABDOMINAL PAIN: 0

## 2023-07-30 ASSESSMENT — PAIN SCALES - GENERAL: PAINLEVEL_OUTOF10: 8

## 2023-07-30 ASSESSMENT — PAIN DESCRIPTION - LOCATION: LOCATION: KNEE

## 2023-07-30 ASSESSMENT — PAIN DESCRIPTION - ORIENTATION: ORIENTATION: LEFT

## 2023-07-30 ASSESSMENT — PAIN - FUNCTIONAL ASSESSMENT: PAIN_FUNCTIONAL_ASSESSMENT: 0-10

## 2023-07-30 NOTE — DISCHARGE INSTRUCTIONS
Use Mobic once daily for pain. Do not take other NSAIDs like Celebrex or Advil while taking this medication. Return for new or worsening symptoms. Please follow-up with your orthopedic doctor for further management.

## 2023-07-30 NOTE — ED PROVIDER NOTES
Emergency Department Provider Note       PCP: Moni Manzano MD   Age: 37 y.o. Sex: female     DISPOSITION Decision To Discharge 07/30/2023 02:19:09 PM       ICD-10-CM    1. Acute pain of left knee  M25.562 [address] [place] [address]       2. Chronic pain of right knee  M25.561     G89.29       3. Soft tissue lesion of knee region  M79.9           Medical Decision Making     Complexity of Problems Addressed:  1 chronic illness with exacerbation, 1 acute uncomplicated illness    Data Reviewed and Analyzed:  Category 1:   I independently ordered and reviewed each unique test.  I reviewed external records: provider visit note from outside specialist.  I reviewed external records: previous imaging study including radiologist interpretation. Category 2:   I interpreted the X-rays no fracture, arthritis present. RADIOLOGY DISCLAIMER: Although I do my best to interpret the imaging, I am not a board-certified radiologist.  I am still basing my medical decision making off of the board-certified radiology interpretation provided to me. Category 3: Discussion of management or test interpretation. In summary this is a 18-year-old female patient presented for evaluation of left knee pain acutely as well as right knee pain chronically. I suspect her left knee pain is due to overuse and could possibly be secondary to some compensation from the pain in her knee that she has been experiencing on the right. She also has a cystic appearing and feeling structure on the right lateral knee that I did offer drainage today which the patient declined. Based on my evaluation I feel the patient is at low risk for alternative causes such as compartment syndrome, distal neurovascular injury, open fracture, septic joint. The reasoning behind my decision process is that the patient is overall well-appearing with no acute distress noted.  There is no presence of an open wound that would Speaking Coherently

## 2023-07-30 NOTE — ED TRIAGE NOTES
Patient complaining of of left knee pain for about 1 week advises that she has been possibly compensating due to large knot on right knee that has been present 3-4 months.

## 2023-08-11 ENCOUNTER — OFFICE VISIT (OUTPATIENT)
Dept: ORTHOPEDIC SURGERY | Age: 43
End: 2023-08-11
Payer: MEDICAID

## 2023-08-11 DIAGNOSIS — M25.562 LEFT KNEE PAIN, UNSPECIFIED CHRONICITY: Primary | ICD-10-CM

## 2023-08-11 PROCEDURE — 99214 OFFICE O/P EST MOD 30 MIN: CPT | Performed by: SPECIALIST/TECHNOLOGIST

## 2023-08-11 RX ORDER — GABAPENTIN 300 MG/1
300 CAPSULE ORAL 3 TIMES DAILY
Qty: 90 CAPSULE | Refills: 0 | Status: SHIPPED | OUTPATIENT
Start: 2023-08-11 | End: 2023-09-10

## 2023-08-11 NOTE — PROGRESS NOTES
Name: Fletcher Mullins  YOB: 1980  Gender: female  MRN: [id number]      CC: Knee Pain (L)       HPI: Fletcher Mullins is a 37 y.o. female who presents with Knee Pain (L)  Reports left knee pain for the last 2 to 3 weeks with no mechanism of injury. She reports that she has difficulty with stairs and she feels that her knee pops out and she has difficulty walking. She also reports she has difficulty with flexion and she has a burning sensation in the anterior knee anterior shin that is painful with sitting. She went to the ER on 7/30/2023 because her left knee pain was unbearable. Feels like she is locking and she cannot pivot or turning without severe pain in her knee. Reports it is happening every day. Has a cyst on her right knee that is sore, but not as bad as when it first formed. ROS/Meds/PSH/PMH/FH/SH: I personally reviewed the patients standard intake form. Below are the pertinents    Tobacco:  reports that she has never smoked. She has never used smokeless tobacco.  Diabetes: none  Other: CHF, GERD, asthma    Physical Examination:  General: no acute distress  Lungs: breathing easily  CV: regular rhythm by pulse  Left Knee: Minimal effusion present. Exquisite tenderness to palpation over the lateral joint line and medial border patella. Full active extension with flexion limited to 100 degrees. Unable to perform Lachman's due to patient guarding and pain. Stable to varus and valgus stress 0 and 30 degrees. Positive Alin's with recreation of patient's symptoms. Positive bounce home test.  Positive patellar apprehension test.  1+ quadrant of lateral translation but difficult to assess due to patient pain and guarding. Imaging:   I reviewed 3 views of the left knee from the emergency department on 7/30/2023 which shows no acute fracture, dislocation, mild degenerative changes localized to the medial patellar compartments.     All imaging interpreted by myself Bernard Momin

## 2023-08-28 RX ORDER — GABAPENTIN 300 MG/1
300 CAPSULE ORAL 3 TIMES DAILY
Qty: 90 CAPSULE | Refills: 0 | Status: SHIPPED | OUTPATIENT
Start: 2023-08-28 | End: 2023-09-27

## 2023-10-09 ENCOUNTER — TELEPHONE (OUTPATIENT)
Dept: ORTHOPEDIC SURGERY | Age: 43
End: 2023-10-09

## 2023-10-09 DIAGNOSIS — M25.562 LEFT KNEE PAIN, UNSPECIFIED CHRONICITY: Primary | ICD-10-CM

## 2023-10-09 DIAGNOSIS — M25.561 RIGHT KNEE PAIN, UNSPECIFIED CHRONICITY: ICD-10-CM

## 2023-10-09 RX ORDER — GABAPENTIN 300 MG/1
300 CAPSULE ORAL 3 TIMES DAILY
Qty: 90 CAPSULE | Refills: 0 | Status: SHIPPED | OUTPATIENT
Start: 2023-10-09 | End: 2023-11-08

## 2023-10-09 NOTE — TELEPHONE ENCOUNTER
She is requesting a refill on gabapentin 300mg tid to [facility] in [place].

## 2023-10-09 NOTE — PROGRESS NOTES
Patient called for a refill on her gabapentin medication. Medication sent to pharmacy.   Emile Noriega PA-C

## 2024-03-22 ENCOUNTER — OFFICE VISIT (OUTPATIENT)
Age: 44
End: 2024-03-22

## 2024-03-22 VITALS — TEMPERATURE: 98.4 F | SYSTOLIC BLOOD PRESSURE: 114 MMHG | HEART RATE: 86 BPM | DIASTOLIC BLOOD PRESSURE: 86 MMHG

## 2024-03-22 DIAGNOSIS — R21 RASH: Primary | ICD-10-CM

## 2024-03-22 RX ORDER — TRIAMCINOLONE ACETONIDE 0.25 MG/G
CREAM TOPICAL
Qty: 80 G | Refills: 1 | Status: SHIPPED | OUTPATIENT
Start: 2024-03-22

## 2024-03-22 ASSESSMENT — ENCOUNTER SYMPTOMS
GASTROINTESTINAL NEGATIVE: 1
EYES NEGATIVE: 1
RESPIRATORY NEGATIVE: 1
SHORTNESS OF BREATH: 0

## 2024-03-22 NOTE — PROGRESS NOTES
PROGRESS NOTE    SUBJECTIVE:   Xochitl Dickinson is a 43 y.o. female seen for rash and scarring on both hands.  Patient recently went to her PCP and was placed on keflex and given bacitracin ointment for rash on hands that she admits to picking at and digging at with fingernail clippers.  She admits that she tried a new perfume and noticed the rash shortly after.  Patient denies any pruritus and states that the rash consisted of small red dots, not fluid filled, on hands.  States she picks at sores as a nervous habit.  Patient states she was on Cymbalta for her anxiety but has not taken that medication in over one month but still has a refill of that medication.  States that she is very embarrassed with how her hands look and would like to see if there is another medication that might work better than the bacitracin.    Patient states that she is experiencing increased stress at work and at home.  She likes to color to reduce her stress and denies seeking any counseling services for her anxiety.      Rash  This is a new problem. The current episode started more than 1 month ago. The problem has been gradually improving since onset. The affected locations include the right upper leg, right hand and left hand. The rash is characterized by dryness and redness. She was exposed to a new detergent/soap. Pertinent negatives include no shortness of breath. Past treatments include antibiotics and antibiotic cream. The treatment provided mild relief. Her past medical history is significant for allergies and asthma.       Current Outpatient Medications   Medication Sig Dispense Refill    triamcinolone (KENALOG) 0.025 % cream Apply topically 2 times daily. 80 g 1    gabapentin (NEURONTIN) 300 MG capsule Take 1 capsule by mouth 3 times daily for 30 days. Intended supply: 90 days 90 capsule 0    meloxicam (MOBIC) 15 MG tablet Take 1 tablet by mouth daily 30 tablet 0    celecoxib (CELEBREX) 200 MG capsule Take 1 capsule by mouth

## 2024-04-08 ENCOUNTER — OFFICE VISIT (OUTPATIENT)
Age: 44
End: 2024-04-08

## 2024-04-08 DIAGNOSIS — Z09 FOLLOW-UP EXAM AFTER TREATMENT: Primary | ICD-10-CM

## 2024-04-08 PROBLEM — M85.2: Status: ACTIVE | Noted: 2020-02-25

## 2024-04-08 ASSESSMENT — ENCOUNTER SYMPTOMS: RESPIRATORY NEGATIVE: 1

## 2024-04-08 NOTE — PROGRESS NOTES
make appointment with PCP for yearly exam especially with her health history and having chronic conditions.    Advised patient to come to wellness clinic for lab work which results can be sent to her PCP.      Follow up as needed.     Counseled on benefits of having a primary care provider which includes, but is not limited to, continuity of care and having a medical home when concerns arise. Also enforced that onsite clinic policy states that we are not to take the place of a primary care provider, pt verbalized understanding.     SEs and risk vs benefits associated with medications prescribed discussed with patient who verbalized understanding. Pt verbalized understanding and agreement with plan of care. RTC for persisting/worsening symptoms or new complaints that arise. Discussed signs and symptoms that would warrant immediate evaluation including, but not limited to HA, blurred vision, speech disturbance, difficulty with ambulation/gait, numbness, tingling, weakness, syncope, chest pain, or shortness of breath.    I have reviewed the patient's medication list, past medical, family, social, and surgical history in detail and updated the patient record appropriately.  No follow-up provider specified.    Mery Velazquez NP

## 2024-04-17 ENCOUNTER — OFFICE VISIT (OUTPATIENT)
Age: 44
End: 2024-04-17

## 2024-04-17 DIAGNOSIS — T85.848A DENTAL IMPLANT PAIN, INITIAL ENCOUNTER: Primary | ICD-10-CM

## 2024-04-17 RX ORDER — HYDROXYZINE PAMOATE 25 MG/1
25 CAPSULE ORAL
COMMUNITY
Start: 2024-02-03

## 2024-04-17 RX ORDER — IBUPROFEN 800 MG/1
800 TABLET ORAL EVERY 8 HOURS PRN
Qty: 90 TABLET | Refills: 0 | Status: SHIPPED | OUTPATIENT
Start: 2024-04-17 | End: 2024-05-17

## 2024-04-17 ASSESSMENT — ENCOUNTER SYMPTOMS
FACIAL SWELLING: 1
RESPIRATORY NEGATIVE: 1
SINUS PRESSURE: 0

## 2024-04-17 NOTE — PROGRESS NOTES
verbalized understanding. Pt verbalized understanding and agreement with plan of care. RTC for persisting/worsening symptoms or new complaints that arise. Discussed signs and symptoms that would warrant immediate evaluation including, but not limited to HA, blurred vision, speech disturbance, difficulty with ambulation/gait, numbness, tingling, weakness, syncope, chest pain, or shortness of breath.    I have reviewed the patient's medication list, past medical, family, social, and surgical history in detail and updated the patient record appropriately.  No follow-up provider specified.    Mery Velazquez NP

## 2024-09-23 ENCOUNTER — OFFICE VISIT (OUTPATIENT)
Dept: ORTHOPEDIC SURGERY | Age: 44
End: 2024-09-23
Payer: MEDICAID

## 2024-09-23 VITALS — WEIGHT: 214 LBS | HEIGHT: 65 IN | BODY MASS INDEX: 35.65 KG/M2

## 2024-09-23 DIAGNOSIS — M51.36 DDD (DEGENERATIVE DISC DISEASE), LUMBAR: ICD-10-CM

## 2024-09-23 DIAGNOSIS — M47.816 LUMBAR SPONDYLOSIS: Primary | ICD-10-CM

## 2024-09-23 PROCEDURE — 99204 OFFICE O/P NEW MOD 45 MIN: CPT | Performed by: PHYSICIAN ASSISTANT

## 2024-09-23 RX ORDER — METHYLPREDNISOLONE 4 MG
TABLET, DOSE PACK ORAL
Qty: 1 KIT | Refills: 0 | Status: SHIPPED | OUTPATIENT
Start: 2024-09-23

## 2024-09-23 RX ORDER — LITHIUM CARBONATE 300 MG/1
300 CAPSULE ORAL 2 TIMES DAILY
COMMUNITY
Start: 2024-08-15

## 2025-05-21 ENCOUNTER — OFFICE VISIT (OUTPATIENT)
Dept: ORTHOPEDIC SURGERY | Age: 45
End: 2025-05-21
Payer: MEDICAID

## 2025-05-21 ENCOUNTER — TELEPHONE (OUTPATIENT)
Dept: ORTHOPEDIC SURGERY | Age: 45
End: 2025-05-21

## 2025-05-21 VITALS — BODY MASS INDEX: 38.32 KG/M2 | HEIGHT: 65 IN | WEIGHT: 230 LBS

## 2025-05-21 DIAGNOSIS — M54.50 LOW BACK PAIN, UNSPECIFIED BACK PAIN LATERALITY, UNSPECIFIED CHRONICITY, UNSPECIFIED WHETHER SCIATICA PRESENT: ICD-10-CM

## 2025-05-21 DIAGNOSIS — M25.552 LEFT HIP PAIN: Primary | ICD-10-CM

## 2025-05-21 PROCEDURE — 99215 OFFICE O/P EST HI 40 MIN: CPT | Performed by: PHYSICIAN ASSISTANT

## 2025-05-21 RX ORDER — CELECOXIB 200 MG/1
200 CAPSULE ORAL DAILY
Qty: 14 CAPSULE | Refills: 0 | Status: SHIPPED | OUTPATIENT
Start: 2025-05-21 | End: 2025-06-04

## 2025-05-21 NOTE — TELEPHONE ENCOUNTER
She saw Dwain today. The [facility] is requiring a PA and she is asking if you can take care of this for her.

## 2025-05-21 NOTE — PROGRESS NOTES
[facility]      Patient ID:  Name: Xochitl Dickinson  AGE/Gender: 44 y.o. female  MRN: [id number]  : 1980    Date of Consultation:  May 21, 2025      ALLERGIES:   Allergies   Allergen Reactions    Penicillins Other (See Comments)     Abdominal Pain    Apple Juice Itching and Swelling    Prunus Persica Itching and Swelling        CC: Left hip pain     History:  The patient presents today as a new patient complaining primarily of left hip pain that started 3-1/2 weeks ago.  She initially attributed this to moving and lifting furniture.  She denies any specific injuries.  She been taking 800 mg ibuprofen.  She went to the emergency department on 516.  As she had had recurrent falls for several weeks.  She is not sure what is causing her to fall.  She says her left leg gives way on her.  She was prescribed Ultram and reports this is not helping.  She said she was referred to pain management but I have not seen scheduled appointment yet.  She localizes the pain to the left groin and describes this as sharp and thumping.  She says her left leg feels heavy and gives way on her.  She reports that she has a history of bariatric surgery and at her heaviest was over 400 pounds.  She has seen Dr. Winston Jimenez the practice before for her left knee and has seen Tk Morales in the past for her lumbar spine.  They have no other complaints or concerns.    Review of Systems:  Pertinent items are noted in HPI.    Past Medical History Includes:   Past Medical History:   Diagnosis Date    Alcohol withdrawal seizure with complication ([facility]) 8/15/2021    Alcohol withdrawal seizure with complication ([facility]) 08/15/2021    Anxiety     Asthma     CHF (congestive heart failure) ([facility]) 2/10/2019    Gastrointestinal disorder     IBS    GERD (gastroesophageal reflux disease) 2015    H/O gastric bypass     Oral allergy syndrome     Ovarian cyst     Primary hypertension 2022    Severe episode of recurrent major

## 2025-05-28 ENCOUNTER — TELEPHONE (OUTPATIENT)
Dept: ORTHOPEDIC SURGERY | Age: 45
End: 2025-05-28

## 2025-05-28 NOTE — TELEPHONE ENCOUNTER
She says [facility] never received the prior auth. Also she hasn't heard from anyone about an MRI. Please call.

## 2025-05-28 NOTE — TELEPHONE ENCOUNTER
Called and spoke to pt to inform that we have not seen PA request. Gave pt the correct fax number to the office. Gave pt the correct radiology phone number for her to call and schedule MRI. Pt voiced understanding.